# Patient Record
Sex: MALE | Race: OTHER | HISPANIC OR LATINO | URBAN - METROPOLITAN AREA
[De-identification: names, ages, dates, MRNs, and addresses within clinical notes are randomized per-mention and may not be internally consistent; named-entity substitution may affect disease eponyms.]

---

## 2022-02-04 ENCOUNTER — EMERGENCY (EMERGENCY)
Facility: HOSPITAL | Age: 62
LOS: 1 days | Discharge: HOME | End: 2022-02-04
Attending: EMERGENCY MEDICINE | Admitting: EMERGENCY MEDICINE
Payer: COMMERCIAL

## 2022-02-04 VITALS
TEMPERATURE: 98 F | RESPIRATION RATE: 16 BRPM | SYSTOLIC BLOOD PRESSURE: 246 MMHG | DIASTOLIC BLOOD PRESSURE: 134 MMHG | OXYGEN SATURATION: 100 % | HEART RATE: 64 BPM

## 2022-02-04 DIAGNOSIS — N20.0 CALCULUS OF KIDNEY: ICD-10-CM

## 2022-02-04 DIAGNOSIS — I10 ESSENTIAL (PRIMARY) HYPERTENSION: ICD-10-CM

## 2022-02-04 DIAGNOSIS — M54.9 DORSALGIA, UNSPECIFIED: ICD-10-CM

## 2022-02-04 DIAGNOSIS — R10.31 RIGHT LOWER QUADRANT PAIN: ICD-10-CM

## 2022-02-04 DIAGNOSIS — N50.811 RIGHT TESTICULAR PAIN: ICD-10-CM

## 2022-02-04 PROCEDURE — 76870 US EXAM SCROTUM: CPT | Mod: 26

## 2022-02-04 PROCEDURE — 99285 EMERGENCY DEPT VISIT HI MDM: CPT

## 2022-02-04 RX ORDER — KETOROLAC TROMETHAMINE 30 MG/ML
15 SYRINGE (ML) INJECTION ONCE
Refills: 0 | Status: DISCONTINUED | OUTPATIENT
Start: 2022-02-04 | End: 2022-02-04

## 2022-02-04 RX ORDER — ACETAMINOPHEN 500 MG
1000 TABLET ORAL ONCE
Refills: 0 | Status: COMPLETED | OUTPATIENT
Start: 2022-02-04 | End: 2022-02-04

## 2022-02-04 RX ADMIN — Medication 400 MILLIGRAM(S): at 23:53

## 2022-02-04 RX ADMIN — Medication 15 MILLIGRAM(S): at 23:53

## 2022-02-04 NOTE — ED ADULT TRIAGE NOTE - CHIEF COMPLAINT QUOTE
RLQ since monday worsening today accompanied by nausea.  No vomiting.  Hx of HTN, not taking medications at this time.  Denies dizziness/headache. RLQ since monday worsening today accompanied by nausea.  No vomiting.  Hx of HTN, not taking medications at this time.  Denies dizziness/headache/blurred vision.  EKG in progress.

## 2022-02-04 NOTE — ED ADULT NURSE NOTE - CHIEF COMPLAINT QUOTE
RLQ since monday worsening today accompanied by nausea.  No vomiting.  Hx of HTN, not taking medications at this time.  Denies dizziness/headache/blurred vision.  EKG in progress.

## 2022-02-04 NOTE — ED ADULT NURSE NOTE - OBJECTIVE STATEMENT
RLQ pain since Monday of this week worst today, pt hx of HTN non compliance with his medications for a year.

## 2022-02-05 VITALS
DIASTOLIC BLOOD PRESSURE: 116 MMHG | OXYGEN SATURATION: 98 % | TEMPERATURE: 98 F | HEART RATE: 63 BPM | SYSTOLIC BLOOD PRESSURE: 218 MMHG | RESPIRATION RATE: 16 BRPM

## 2022-02-05 PROCEDURE — 74176 CT ABD & PELVIS W/O CONTRAST: CPT | Mod: MG

## 2022-02-05 PROCEDURE — 76870 US EXAM SCROTUM: CPT

## 2022-02-05 PROCEDURE — 87086 URINE CULTURE/COLONY COUNT: CPT

## 2022-02-05 PROCEDURE — 85730 THROMBOPLASTIN TIME PARTIAL: CPT

## 2022-02-05 PROCEDURE — 85025 COMPLETE CBC W/AUTO DIFF WBC: CPT

## 2022-02-05 PROCEDURE — 80053 COMPREHEN METABOLIC PANEL: CPT

## 2022-02-05 PROCEDURE — 74176 CT ABD & PELVIS W/O CONTRAST: CPT | Mod: 26,MG

## 2022-02-05 PROCEDURE — 99284 EMERGENCY DEPT VISIT MOD MDM: CPT | Mod: 25

## 2022-02-05 PROCEDURE — 96374 THER/PROPH/DIAG INJ IV PUSH: CPT

## 2022-02-05 PROCEDURE — 36415 COLL VENOUS BLD VENIPUNCTURE: CPT

## 2022-02-05 PROCEDURE — 81001 URINALYSIS AUTO W/SCOPE: CPT

## 2022-02-05 PROCEDURE — G1004: CPT

## 2022-02-05 PROCEDURE — 96375 TX/PRO/DX INJ NEW DRUG ADDON: CPT

## 2022-02-05 PROCEDURE — 85610 PROTHROMBIN TIME: CPT

## 2022-02-05 RX ORDER — TAMSULOSIN HYDROCHLORIDE 0.4 MG/1
0.4 CAPSULE ORAL AT BEDTIME
Refills: 0 | Status: DISCONTINUED | OUTPATIENT
Start: 2022-02-05 | End: 2022-02-08

## 2022-02-05 RX ORDER — SODIUM CHLORIDE 9 MG/ML
1000 INJECTION INTRAMUSCULAR; INTRAVENOUS; SUBCUTANEOUS ONCE
Refills: 0 | Status: COMPLETED | OUTPATIENT
Start: 2022-02-05 | End: 2022-02-05

## 2022-02-05 RX ORDER — HYDROCHLOROTHIAZIDE 25 MG
12.5 TABLET ORAL ONCE
Refills: 0 | Status: COMPLETED | OUTPATIENT
Start: 2022-02-05 | End: 2022-02-05

## 2022-02-05 RX ADMIN — Medication 12.5 MILLIGRAM(S): at 02:14

## 2022-02-05 RX ADMIN — TAMSULOSIN HYDROCHLORIDE 0.4 MILLIGRAM(S): 0.4 CAPSULE ORAL at 02:14

## 2022-02-05 RX ADMIN — SODIUM CHLORIDE 1000 MILLILITER(S): 9 INJECTION INTRAMUSCULAR; INTRAVENOUS; SUBCUTANEOUS at 01:11

## 2022-02-05 NOTE — ED PROVIDER NOTE - PROGRESS NOTE DETAILS
Pts BP noted to be elevated. Pt asymptomatic. Pt made aware and advised to f/u with pmd will d/c home with HCTZ Pts BP noted to be elevated in setting of noncompliance w/ known reported baseline sbp 200s. Pt asymptomatic. Pt made aware and advised to f/u with pmd will d/c home with HCTZ

## 2022-02-05 NOTE — ED PROVIDER NOTE - GENITOURINARY, MLM
No inguinal adenopathy appreciated, no mass palpated, right testicular tenderness, no discoloration or penile discharge, no lesions.

## 2022-02-05 NOTE — ED PROVIDER NOTE - CARE PROVIDER_API CALL
Janny Linaers  Urology  [address], [facility] [place] [address]  Phone: [phone]  Fax: [phone]  Follow Up Time:

## 2022-02-05 NOTE — ED PROVIDER NOTE - NSFOLLOWUPINSTRUCTIONS_ED_ALL_ED_FT
Follow up with your PMD for re-evaluation and further management of your elevated BP. Follow up with  urologist next week.                [facility] - General Information           Cálculos renales    LO QUE NECESITA SABER:    ¿Qué son los cálculos renales?Los cálculos renales se troy en el sistema urinario cuando el agua y los residuos de la orina no están stacy balanceados. Cuando esto sucede, ciertos tipos de to de desecho se separan de la orina. Los to se acumulan y troy piedras en los riñones. Los cálculos renales pueden estar compuestos de ácido úrico, calcio, fosfato o to de oxalato. Podría tener más de un cálculo.    Cálculos renales         ¿Qué aumenta mi riesgo de cálculos renales?  •No hemalatha suficientes líquidos (especialmente agua) todos los días      •Tener infecciones frecuentes del tracto urinario      •Demasiada cantidad de ciertos alimentos, jame carne, sal, nueces y chocolate      •Obesidad      •Ciertos medicamentos, jame diuréticos, esteroides y antiácidos      •Antecedentes familiares de cálculos renales      •Nacer con un trastorno renal o intestinal      ¿Cuáles son los signos y síntomas de los cálculos renales?  •Dolor en la parte media de la espalda que se mueve a través de un costado o que podría propagarse a la conchita      •Náuseas y vómitos      •Necesidad de orinar con frecuencia, sensación de ardor al orinar u orina color obie o archer      •Sensibilidad en la parte inferior de la espalda, en el costado o en el estómago      ¿Cómo se diagnostican los cálculos renales?El médico va a hacerle preguntas sobre farmer dany y alimentos habituales. El médico podría derivarlo a un urólogo. Es posible que usted necesite exámenes para determinar el tipo de cálculos renales que tiene. Los exámenes pueden mostrar el tamaño de los cálculos y en dónde se encuentran en el sistema urinario. Usted podría necesitar más de major de los siguientes:  •Análisis de orinapodrían mostrar si usted tiene rakel en la orina. También podrían mostrar altas cantidades de la sustancia que forma los cálculos renales, jame el ácido úrico.      •Los análisis de sangremuestran lo stacy que funcionan lisa riñones. También podrían utilizarse para revisar los niveles de calcio o de ácido úrico en la rakel.      •Marium radiografía o ultrasonidose pueden hemalatha de lisa riñones, vejiga y uréteres. Es posible que le administren un líquido de contraste antes de la radiografía para que se vean con mayor claridad en las imágenes. Usted podría necesitar que le realicen más de marium radiografía. Dígale al médico si usted alguna vez ha tenido marium reacción alérgica al líquido de contraste.      ¿Cómo se tratan los cálculos renales?  •Los COLLEEN,jame el ibuprofeno, ayudan a disminuir la inflamación, el dolor y la fiebre. Kaylan medicamento está disponible con o sin marium receta médica. Los COLLEEN pueden causar sangrado estomacal o problemas renales en ciertas personas. Si usted saulo un medicamento anticoagulante, siempre pregúntele a farmer médico si los COLLEEN son seguros para usted. Siempre jose la etiqueta de kaylan medicamento y siga las instrucciones.      •Acetaminofénalivia el dolor y baja la fiebre. Está disponible sin receta médica. Pregunte la cantidad y la frecuencia con que debe tomarlos. Siga las indicaciones. Jose las etiquetas de todos los demás medicamentos que esté usando para saber si también contienen acetaminofén, o pregunte a farmer médico o farmacéutico. El acetaminofén puede causar daño en el hígado cuando no se saulo de forma correcta. No use más de 4 gramos (4000 miligramos) en total de acetaminofeno en un día.      •Puede administrarsepodrían administrarse. Pregunte al médico cómo debe hemalatha kaylan medicamento de forma lopez. Algunos medicamentos recetados para el dolor contienen acetaminofén. No tome otros medicamentos que contengan acetaminofén sin consultarlo con farmer médico. Demasiado acetaminofeno puede causar daño al hígado. Los medicamentos recetados para el dolor podrían causar estreñimiento. Pregunte a farmer médico jame prevenir o tratar estreñimiento.      •Los medicamentospara balancear el nivel de los electrolitos.      •Un procedimiento o marium cirugíapara remover los cálculos renales si no se eliminan por sí solos. El tratamiento dependerá del tamaño y ubicación de los cálculos renales.      ¿Qué puedo hacer para controlar mis cálculos?  •Ingiera más líquidos.Es probable que farmer médico le indique que tome al menos 8 a 12 vasos (de ocho onzas) de líquidos al día. [place] ayuda a deshacerse de los cálculos renales cuando usted orina. El agua es el mejor líquido para hemalatha.      •Cuele la orina cada vez que use el baño.Orine por medio de un colador o un pedazo de [place] para así recolectar los cálculos. Lleve los cálculos donde farmer médico para que pueda enviarlos al laboratorio y realizarles exámenes. [place] ayudará a farmer médico a planear el mejor tratamiento para usted.  Buscar piedras en el filtro           •Pregunte si debe evitar algún alimento.Es posible que necesite limitar el oxalato. El oxalato es marium sustancia química que se encuentra en algunos alimentos de origen vegetal. El tipo de cálculo renal más común se compone de to que contienen calcio y oxalato. Farmer médico o dietista podría recomendar que limite el consumo de oxalato si sufre de kaylan tipo de cálculo renal frecuentemente. Es probable que usted tenga que limitar la cantidad de sodio (sal) o proteínas que usted come. Pida más información sobre los mejores alimentos para usted.  Alimentos con alto contenido de oxalatos           •Manténgase físicamente activo según las instrucciones.Lisa cálculos pueden pasar con más facilidad si usted permanece activo. La actividad física también puede ayudarle a controlar el peso. Pregunte sobre cuáles son las mejores actividades para usted.  Agapito hispana caminando jame ejercicio           ¿Cuándo georgina buscar atención inmediata?  •Tiene vómitos y no se alivian con medicamentos.          ¿Cuándo georgina llamar a mi médico?  •Tiene fiebre.      •Tiene dificultad para orinar.      •Usted orina con rakel.      •Usted tiene dolor intenso.      •Tiene alguna pregunta o inquietud acerca de farmer condición o cuidado.      ACUERDOS SOBRE FARMER CUIDADO:    Usted tiene el derecho de ayudar a planear farmer cuidado. Aprenda todo lo que pueda sobre farmer condición y jame darle tratamiento. Discuta lisa opciones de tratamiento con lisa médicos para decidir el cuidado que usted desea recibir. Usted siempre tiene el derecho de rechazar el tratamiento. Follow up with your PMD for re-evaluation and further management of your elevated BP. Follow up with  urologist next week.                [facility] - General Information           Cálculos renales    LO QUE NECESITA SABER:    ¿Qué son los cálculos renales?Los cálculos renales se troy en el sistema urinario cuando el agua y los residuos de la orina no están stacy balanceados. Cuando esto sucede, ciertos tipos de to de desecho se separan de la orina. Los to se acumulan y troy piedras en los riñones. Los cálculos renales pueden estar compuestos de ácido úrico, calcio, fosfato o to de oxalato. Podría tener más de un cálculo.    Cálculos renales         ¿Qué aumenta mi riesgo de cálculos renales?  •No hemalatha suficientes líquidos (especialmente agua) todos los días      •Tener infecciones frecuentes del tracto urinario      •Demasiada cantidad de ciertos alimentos, jame carne, sal, nueces y chocolate      •Obesidad      •Ciertos medicamentos, jame diuréticos, esteroides y antiácidos      •Antecedentes familiares de cálculos renales      •Nacer con un trastorno renal o intestinal      ¿Cuáles son los signos y síntomas de los cálculos renales?  •Dolor en la parte media de la espalda que se mueve a través de un costado o que podría propagarse a la conchita      •Náuseas y vómitos      •Necesidad de orinar con frecuencia, sensación de ardor al orinar u orina color obie o archer      •Sensibilidad en la parte inferior de la espalda, en el costado o en el estómago      ¿Cómo se diagnostican los cálculos renales?El médico va a hacerle preguntas sobre farmer dany y alimentos habituales. El médico podría derivarlo a un urólogo. Es posible que usted necesite exámenes para determinar el tipo de cálculos renales que tiene. Los exámenes pueden mostrar el tamaño de los cálculos y en dónde se encuentran en el sistema urinario. Usted podría necesitar más de major de los siguientes:  •Análisis de orinapodrían mostrar si usted tiene rakel en la orina. También podrían mostrar altas cantidades de la sustancia que forma los cálculos renales, jame el ácido úrico.      •Los análisis de sangremuestran lo stacy que funcionan lisa riñones. También podrían utilizarse para revisar los niveles de calcio o de ácido úrico en la rakel.      •Marium radiografía o ultrasonidose pueden hemalatha de lisa riñones, vejiga y uréteres. Es posible que le administren un líquido de contraste antes de la radiografía para que se vean con mayor claridad en las imágenes. Usted podría necesitar que le realicen más de marium radiografía. Dígale al médico si usted alguna vez ha tenido marium reacción alérgica al líquido de contraste.      ¿Cómo se tratan los cálculos renales?  •Los COLLEEN,jame el ibuprofeno, ayudan a disminuir la inflamación, el dolor y la fiebre. Kaylan medicamento está disponible con o sin marium receta médica. Los COLLEEN pueden causar sangrado estomacal o problemas renales en ciertas personas. Si usted saulo un medicamento anticoagulante, siempre pregúntele a farmer médico si los COLLEEN son seguros para usted. Siempre jose la etiqueta de kaylan medicamento y siga las instrucciones.      •Acetaminofénalivia el dolor y baja la fiebre. Está disponible sin receta médica. Pregunte la cantidad y la frecuencia con que debe tomarlos. Siga las indicaciones. Jose las etiquetas de todos los demás medicamentos que esté usando para saber si también contienen acetaminofén, o pregunte a farmer médico o farmacéutico. El acetaminofén puede causar daño en el hígado cuando no se saulo de forma correcta. No use más de 4 gramos (4000 miligramos) en total de acetaminofeno en un día.      •Puede administrarsepodrían administrarse. Pregunte al médico cómo debe hemalatha kaylan medicamento de forma lopez. Algunos medicamentos recetados para el dolor contienen acetaminofén. No tome otros medicamentos que contengan acetaminofén sin consultarlo con farmer médico. Demasiado acetaminofeno puede causar daño al hígado. Los medicamentos recetados para el dolor podrían causar estreñimiento. Pregunte a farmer médico jame prevenir o tratar estreñimiento.      •Los medicamentospara balancear el nivel de los electrolitos.      •Un procedimiento o marium cirugíapara remover los cálculos renales si no se eliminan por sí solos. El tratamiento dependerá del tamaño y ubicación de los cálculos renales.      ¿Qué puedo hacer para controlar mis cálculos?  •Ingiera más líquidos.Es probable que farmer médico le indique que tome al menos 8 a 12 vasos (de ocho onzas) de líquidos al día. [place] ayuda a deshacerse de los cálculos renales cuando usted orina. El agua es el mejor líquido para hemalatha.      •Cuele la orina cada vez que use el baño.Orine por medio de un colador o un pedazo de [place] para así recolectar los cálculos. Lleve los cálculos donde farmer médico para que pueda enviarlos al laboratorio y realizarles exámenes. [place] ayudará a farmer médico a planear el mejor tratamiento para usted.  Buscar piedras en el filtro           •Pregunte si debe evitar algún alimento.Es posible que necesite limitar el oxalato. El oxalato es marium sustancia química que se encuentra en algunos alimentos de origen vegetal. El tipo de cálculo renal más común se compone de to que contienen calcio y oxalato. Farmer médico o dietista podría recomendar que limite el consumo de oxalato si sufre de kaylan tipo de cálculo renal frecuentemente. Es probable que usted tenga que limitar la cantidad de sodio (sal) o proteínas que usted come. Pida más información sobre los mejores alimentos para usted.  Alimentos con alto contenido de oxalatos           •Manténgase físicamente activo según las instrucciones.Lisa cálculos pueden pasar con más facilidad si usted permanece activo. La actividad física también puede ayudarle a controlar el peso. Pregunte sobre cuáles son las mejores actividades para usted.  Agapito hispana caminando jame ejercicio           ¿Cuándo georgina buscar atención inmediata?  •Tiene vómitos y no se alivian con medicamentos.          ¿Cuándo georgina llamar a mi médico?  •Tiene fiebre.      •Tiene dificultad para orinar.      •Usted orina con rakel.      •Usted tiene dolor intenso.      •Tiene alguna pregunta o inquietud acerca de farmer condición o cuidado.      ACUERDOS SOBRE FARMER CUIDADO:    Usted tiene el derecho de ayudar a planear farmer cuidado. Aprenda todo lo que pueda sobre farmer condición y jame darle tratamiento. Discuta lisa opciones de tratamiento con lisa médicos para decidir el cuidado que usted desea recibir. Usted siempre tiene el derecho de rechazar el tratamiento.        ArabicBosnianCanadian FrenchNorth Central Bronx Hospitaltian CreoleKoreanPolishPortRoosevelt General HospitalTagalogTraditional Magruder HospitaltGroup [facility]                                                                                                                                                                                                                                                           Hipertensión en los adultos    Hypertension, Adult      La presión arterial leela (hipertensión) se produce cuando la fuerza de la rakel bombea a través de las arterias con mucha fuerza. Las arterias son los vasos sanguíneos que transportan la rakel desde el corazón al torey del cuerpo. La hipertensión hace que el corazón jose más esfuerzo para bombear rakel y puede provocar que las arterias se estrechen o endurezcan. La hipertensión no tratada o no controlada puede causar infarto de miocardio, insuficiencia cardíaca, accidente cerebrovascular, enfermedad renal y otros problemas.    Marium lectura de la presión arterial consta de un número más alto sobre un número más bajo. En condiciones ideales, la presión arterial debe estar por debajo de 120/80. El primer número (“superior”) es la presión sistólica. Es la medida de la presión de las arterias cuando el corazón late. El frances número (“inferior”) es la presión diastólica. Es la medida de la presión en las arterias cuando el corazón se relaja.      ¿Cuáles son las causas?    Se desconoce la causa exacta de esta afección. Hay algunas afecciones que causan presión arterial leela o están relacionadas con leslee.      ¿Qué incrementa el riesgo?    Algunos factores de riesgo de hipertensión están bajo farmer control. Los siguientes factores pueden hacer que sea más propenso a desarrollar esta afección:  •Fumar.      •Tener diabetes mellitus tipo 2, colesterol alto, o ambos.      •No hacer la cantidad suficiente de actividad física o ejercicio.      •Tener sobrepeso.      •Consumir mucha grasa, azúcar, calorías o sal (sodio) en farmer dieta.      •Beber alcohol en exceso.      Algunos factores de riesgo para la presión arterial leela pueden ser difíciles o imposibles de cambiar. Algunos de estos factores son los siguientes:  •Tener enfermedad renal crónica.      •Tener antecedentes familiares de presión arterial leela.      •Edad. Los riesgos aumentan con la edad.      •Timmy. El riesgo es mayor para las personas afroamericanas.      •Sexo. Antes de los 45 años, los hombres corren más riesgo que las mujeres. Después de los 65 años, las mujeres corren más riesgo que los hombres.      •Tener apnea obstructiva del sueño.      •Estrés.        ¿Cuáles son los signos o los síntomas?    Es posible que la presión arterial leela puede no cause síntomas. La presión arterial muy leela (crisis hipertensiva) puede provocar:  •Dolor de moises.      •Ansiedad.      •Falta de aire.      •Hemorragia nasal.      •Náuseas y vómitos.      •Cambios en la visión.      •Dolor de pecho intenso.      •Convulsiones.        ¿Cómo se diagnostica?    Esta afección se diagnostica al medir farmer presión arterial mientras se encuentra sentado, con el brazo apoyado sobre marium superficie plana, las piernas sin cruzar y los pies stacy apoyados en el piso. El brazalete del tensiómetro debe colocarse directamente sobre la piel de la parte superior del brazo y al nivel de farmer corazón. Debe medirla al menos dos veces en el mismo brazo. Determinadas condiciones pueden causar marium diferencia de presión arterial entre el brazo fransisca y el derecho.    Ciertos factores pueden provocar que las lecturas de la presión arterial geovany inferiores o superiores a lo normal por un período corto de tiempo:  •Si farmer presión arterial es más leela cuando se encuentra en el consultorio del médico que cuando la mide en farmer hogar, se denomina “hipertensión de bata ciara”. La mayoría de las personas que tienen esta afección no deben ser medicadas.      •Si farmer presión arterial es más leela en el hogar que cuando se encuentra en el consultorio del médico, se denomina “hipertensión enmascarada”. La mayoría de las personas que tienen esta afección deben ser medicadas para controlar la presión arterial.      Si tiene marium lecturas de presión arterial leela ann marium visita o si tiene presión arterial normal con otros factores de riesgo, se le podrá pedir que jose lo siguiente:  •Que regrese otro día para volver a controlar farmer presión arterial nuevamente.      •Que se controle la presión arterial en farmer casa ann 1 semana o más.      Si se le diagnostica hipertensión, es posible que se le realicen otros análisis de rakel o estudios de diagnóstico por imágenes para ayudar a farmer médico a comprender farmer riesgo general de tener otras afecciones.      ¿Cómo se trata?    Esta afección se trata haciendo cambios saludables en el estilo de jaren, tales jame ingerir alimentos saludables, realizar más ejercicio y reducir el consumo de alcohol. El médico puede recetarle medicamentos si los cambios en el estilo de jaren no son suficientes para lograr controlar la presión arterial y si:  •Farmer presión arterial sistólica está por encima de 130.      •Farmer presión arterial diastólica está por encima de 80.      La presión arterial deseada puede variar en función de las enfermedades, la edad y otros factores personales.      Siga estas instrucciones en faremr casa:      Comida y bebida    •Siga marium dieta con alto contenido de fibras y potasio, y con bajo contenido de sodio, azúcar agregada y grasas. Un ejemplo de plan alimenticio es la dieta DASH (Dietary Approaches to Stop Hypertension, Métodos alimenticios para detener la hipertensión). Para alimentarse de esta manera:  •Coma mucha fruta y verdura fresca. Trate de que la mitad del plato de cada comida sea de frutas y verduras.      •Coma cereales integrales, jame pasta integral, arroz integral o pan integral. Llene aproximadamente un cuarto del plato con cereales integrales.      •Coma y yordan productos lácteos con bajo contenido de grasa, jame leche descremada o yogur bajo en grasas.      •Evite la ingesta de hauser de carne grasa, carne procesada o curada, y carne de ave con piel. Llene aproximadamente un cuarto del plato con proteínas magras, jame pescado, dorian sin piel, frijoles, huevos o tofu.      •Evite ingerir alimentos prehechos y procesados. En general, estos tienen mayor cantidad de sodio, azúcar agregada y grasa.        •Reduzca farmer ingesta diaria de sodio. La mayoría de las personas que tienen hipertensión deben comer menos de 1500 mg de sodio por día.    • No yordan alcohol si:  •Farmer médico le indica no hacerlo.      •Está embarazada, puede estar embarazada o está tratando de quedar embarazada.      •Si zoltan alcohol:•Limite la cantidad que zoltan a lo siguiente:  •De 0 a 1 medida por día para las mujeres.      •De 0 a 2 medidas por día para los hombres.        •Esté atento a la cantidad de alcohol que hay en las bebidas que saulo. En los [place], marium medida equivale a marium botella de cerveza de 12 oz (355 ml), un vaso de vino de 5 oz (148 ml) o un vaso de marium bebida alcohólica de leela graduación de 1½ oz (44 ml).          Estilo de jaren      •Trabaje con farmer médico para mantener un peso saludable o perder peso. Pregúntele cuál es el peso recomendado para usted.      •Jose al menos 30 minutos de ejercicio la mayoría de los días de la semana. Estas actividades pueden incluir caminar, nadar o andar en bicicleta.      •Incluya ejercicios para fortalecer lisa músculos (ejercicios de resistencia), jame [place] o levantamiento de pesas, jame parte de farmer rutina semanal de ejercicios. Intente realizar 30 minutos de kaylan tipo de ejercicios al menos nathen días a la semana.      • No consuma ningún producto que contenga nicotina o tabaco, jame cigarrillos, cigarrillos electrónicos y tabaco de mascar. Si necesita ayuda para dejar de fumar, consulte al médico.      •Contrólese la presión arterial en farmer casa según las indicaciones del médico.      •Concurra a todas las visitas de seguimiento jame se lo haya indicado el médico. [place] es importante.      Medicamentos     •[place] los medicamentos de venta aditya y los recetados solamente jame se lo haya indicado el médico. Siga cuidadosamente las indicaciones. Los medicamentos para la presión arterial deben tomarse según las indicaciones.      • No omita las dosis de medicamentos para la presión arterial. Si lo hace, estará en riesgo de tener problemas y puede hacer que los medicamentos geovany menos eficaces.      •Pregúntele a farmer médico a qué efectos secundarios o reacciones a los medicamentos debe prestar atención.        Comuníquese con un médico si:    •Piensa que tiene marium reacción a un medicamento que está tomando.      •Tiene manuel de moises frecuentes (recurrentes).      •Se siente mareado.      •Tiene hinchazón en los tobillos.      •Tiene problemas de visión.        Solicite ayuda inmediatamente si:    •Siente un dolor de moises intenso o confusión.      •Siente debilidad inusual o adormecimiento.      •Siente que va a desmayarse.      •Siente un dolor intenso en el pecho o el abdomen.      •Vomita repetidas veces.      •Tiene dificultad para respirar.        Resumen    •La hipertensión se produce cuando la rakel bombea en las arterias con mucha fuerza. Si esta afección no se controla, podría correr riesgo de tener complicaciones graves.      •La presión arterial deseada puede variar en función de las enfermedades, la edad y otros factores personales. Para la mayoría de las personas, marium presión arterial normal es brianna que 120/80.      •La hipertensión se trata con cambios en el estilo de ajren, medicamentos o marium combinación de ambos. Los cambios en el estilo de jaren incluyen pérdida de peso, ingerir alimentos sanos, seguir marium dieta baja en sodio, hacer más ejercicio y limitar el consumo de alcohol.      Esta información no tiene jame fin reemplazar el consejo del médico. Asegúrese de hacerle al médico cualquier pregunta que tenga.

## 2022-02-05 NOTE — ED PROVIDER NOTE - ATTENDING CONTRIBUTION TO CARE
afebrile. sbp 200s in setting of Rx noncompliance w/ known baseline sbp 200s. no hypoxia. no systemic sx. no acute surgical abd. found to have 2mm R kidney stone now in bladder. poss early orchitis on us but pain resolved w/o uti on ua. no eva. s/p ivf. pain controlled. tolerating po. no indication for emergent urology evaluation  at this time. will dc w/ outpatient urology fu. strict return precautions. pt agrees w/ plan. questions answered.     I saw and discussed the care of the pt directly with the ACP while the pt was in the ED. i have reviewed the ACP note and agree w/ the history, exam and plan of care other than as noted above.

## 2022-02-05 NOTE — ED PROVIDER NOTE - OBJECTIVE STATEMENT
61 y/o M with a pmhx of HTN, non compliant with medication presents to the ED with a cc of right testicular pain and right groin pain radiating to the right lower abdomen and back. Patient reports pain starting x1 week ago, the initial pain was mild and now gradually getting worse. Reports developing nausea today. Denies any vomiting, changes in bowel movement, blood in stool or urine, urinary frequency or urgency, dysuria, fever or chills. Patient states never having this sort of pain in the past. Denies any hx of kidney stones or prior abdominal surgeries.

## 2022-02-05 NOTE — ED PROVIDER NOTE - PATIENT PORTAL LINK FT
You can access the FollowMyHealth Patient Portal offered by [facility] by registering at the following website: [email or web address]. By joining [facility]s FollowMyHealth portal, you will also be able to view your health information using other applications (apps) compatible with our system.

## 2022-02-05 NOTE — ED PROVIDER NOTE - MUSCULOSKELETAL, MLM
Spine appears normal, range of motion is not limited, no muscle or joint tenderness. Tenderness to RLQ, no guarding or rebound.

## 2022-02-05 NOTE — ED PROVIDER NOTE - CARE PLAN
1 Principal Discharge DX:	Kidney stone   Principal Discharge DX:	Kidney stone  Secondary Diagnosis:	Hypertension, uncontrolled

## 2022-02-05 NOTE — ED PROVIDER NOTE - NSFOLLOWUPCLINICS_GEN_ALL_ED_FT
[facility]  Urology  [address] [place], [address] Floor  [place], [place] [address]  Phone: [phone]  Fax:

## 2022-02-05 NOTE — ED PROVIDER NOTE - CLINICAL SUMMARY MEDICAL DECISION MAKING FREE TEXT BOX
63 y/o M with right testicular groin pain, radiating to the right lower abdomen x1 week. Patient reports pain is getting gradually worse and now associated with nausea. Patient has no known hx of kidney stones. Patient denies fever, chills, diarrhea, constipation. Patient is well appearing in the ED. Will check labs, testicular doppler and CT scan of abdomen and will reevaluate.

## 2022-02-11 PROBLEM — Z00.00 ENCOUNTER FOR PREVENTIVE HEALTH EXAMINATION: Status: ACTIVE | Noted: 2022-02-11

## 2023-04-07 NOTE — ED PROVIDER NOTE - NS ED ATTENDING STATEMENT MOD
I have personally performed a face to face diagnostic evaluation on this patient. I have reviewed the ACP note and agree with the history, exam and plan of care, except as noted. Protopic Counseling: Patient may experience a mild burning sensation during topical application. Protopic is not approved in children less than 2 years of age. There have been case reports of hematologic and skin malignancies in patients using topical calcineurin inhibitors although causality is questionable.

## 2023-11-15 NOTE — ED ADULT NURSE NOTE - PRO INTERPRETER NEED 2
Weight: pt reports UBW as 134lbs. Weight per [facility] 134lbs (5/3/23).  Current dosing weight is 125lbs. 
English